# Patient Record
Sex: MALE | Race: WHITE | NOT HISPANIC OR LATINO | URBAN - METROPOLITAN AREA
[De-identification: names, ages, dates, MRNs, and addresses within clinical notes are randomized per-mention and may not be internally consistent; named-entity substitution may affect disease eponyms.]

---

## 2019-04-22 ENCOUNTER — AMBULATORY - HEALTHEAST (OUTPATIENT)
Dept: GERIATRICS | Facility: CLINIC | Age: 84
End: 2019-04-22

## 2019-04-22 RX ORDER — LIDOCAINE 40 MG/G
1 CREAM TOPICAL 3 TIMES DAILY PRN
Status: SHIPPED | COMMUNITY
Start: 2019-04-22

## 2019-04-22 RX ORDER — LEVOTHYROXINE SODIUM 100 UG/1
100 TABLET ORAL DAILY
Status: SHIPPED | COMMUNITY
Start: 2019-04-22

## 2019-04-22 RX ORDER — ACETAMINOPHEN 325 MG/1
325-650 TABLET ORAL EVERY 4 HOURS PRN
Status: SHIPPED | COMMUNITY
Start: 2019-04-22

## 2019-04-22 RX ORDER — POLYETHYLENE GLYCOL 3350 17 G/17G
17 POWDER, FOR SOLUTION ORAL DAILY PRN
Status: SHIPPED | COMMUNITY
Start: 2019-04-22

## 2019-04-23 ENCOUNTER — OFFICE VISIT - HEALTHEAST (OUTPATIENT)
Dept: GERIATRICS | Facility: CLINIC | Age: 84
End: 2019-04-23

## 2019-04-23 DIAGNOSIS — R41.89 COGNITIVE IMPAIRMENT: ICD-10-CM

## 2019-04-23 DIAGNOSIS — I10 HYPERTENSION, UNSPECIFIED TYPE: ICD-10-CM

## 2019-04-23 DIAGNOSIS — I48.91 ATRIAL FIBRILLATION, NEW ONSET (H): ICD-10-CM

## 2019-04-23 DIAGNOSIS — E03.9 HYPOTHYROIDISM, UNSPECIFIED TYPE: ICD-10-CM

## 2019-04-23 DIAGNOSIS — I63.40 CEREBROVASCULAR ACCIDENT (CVA) DUE TO EMBOLISM OF CEREBRAL ARTERY (H): ICD-10-CM

## 2019-04-26 ENCOUNTER — OFFICE VISIT - HEALTHEAST (OUTPATIENT)
Dept: GERIATRICS | Facility: CLINIC | Age: 84
End: 2019-04-26

## 2019-04-26 DIAGNOSIS — I10 HYPERTENSION, UNSPECIFIED TYPE: ICD-10-CM

## 2019-04-26 DIAGNOSIS — R00.1 BRADYCARDIA: ICD-10-CM

## 2019-04-26 DIAGNOSIS — I63.40 CEREBROVASCULAR ACCIDENT (CVA) DUE TO EMBOLISM OF CEREBRAL ARTERY (H): ICD-10-CM

## 2019-04-26 DIAGNOSIS — R41.89 COGNITIVE IMPAIRMENT: ICD-10-CM

## 2019-04-30 ENCOUNTER — OFFICE VISIT - HEALTHEAST (OUTPATIENT)
Dept: GERIATRICS | Facility: CLINIC | Age: 84
End: 2019-04-30

## 2019-04-30 DIAGNOSIS — F32.9 REACTIVE DEPRESSION: ICD-10-CM

## 2019-04-30 DIAGNOSIS — R41.89 COGNITIVE IMPAIRMENT: ICD-10-CM

## 2019-05-02 ENCOUNTER — OFFICE VISIT - HEALTHEAST (OUTPATIENT)
Dept: GERIATRICS | Facility: CLINIC | Age: 84
End: 2019-05-02

## 2019-05-02 DIAGNOSIS — F32.9 REACTIVE DEPRESSION: ICD-10-CM

## 2019-05-02 DIAGNOSIS — R00.1 BRADYCARDIA: ICD-10-CM

## 2019-05-02 DIAGNOSIS — R41.89 COGNITIVE IMPAIRMENT: ICD-10-CM

## 2019-05-08 ENCOUNTER — OFFICE VISIT - HEALTHEAST (OUTPATIENT)
Dept: GERIATRICS | Facility: CLINIC | Age: 84
End: 2019-05-08

## 2019-05-08 DIAGNOSIS — I10 HYPERTENSION, UNSPECIFIED TYPE: ICD-10-CM

## 2019-05-08 DIAGNOSIS — F32.9 REACTIVE DEPRESSION: ICD-10-CM

## 2019-05-08 DIAGNOSIS — R41.89 COGNITIVE IMPAIRMENT: ICD-10-CM

## 2019-05-10 ENCOUNTER — OFFICE VISIT - HEALTHEAST (OUTPATIENT)
Dept: GERIATRICS | Facility: CLINIC | Age: 84
End: 2019-05-10

## 2019-05-10 DIAGNOSIS — E03.9 HYPOTHYROIDISM, UNSPECIFIED TYPE: ICD-10-CM

## 2019-05-10 DIAGNOSIS — I48.91 ATRIAL FIBRILLATION, NEW ONSET (H): ICD-10-CM

## 2019-05-10 DIAGNOSIS — F32.9 REACTIVE DEPRESSION: ICD-10-CM

## 2019-05-10 DIAGNOSIS — I10 HYPERTENSION, UNSPECIFIED TYPE: ICD-10-CM

## 2019-05-10 DIAGNOSIS — R41.89 COGNITIVE IMPAIRMENT: ICD-10-CM

## 2019-05-10 DIAGNOSIS — I63.40 CEREBROVASCULAR ACCIDENT (CVA) DUE TO EMBOLISM OF CEREBRAL ARTERY (H): ICD-10-CM

## 2019-05-10 DIAGNOSIS — R00.1 BRADYCARDIA: ICD-10-CM

## 2019-05-16 ENCOUNTER — OFFICE VISIT - HEALTHEAST (OUTPATIENT)
Dept: GERIATRICS | Facility: CLINIC | Age: 84
End: 2019-05-16

## 2019-05-16 DIAGNOSIS — E03.9 HYPOTHYROIDISM, UNSPECIFIED TYPE: ICD-10-CM

## 2019-05-16 DIAGNOSIS — I48.91 ATRIAL FIBRILLATION, NEW ONSET (H): ICD-10-CM

## 2019-05-16 DIAGNOSIS — I10 HYPERTENSION, UNSPECIFIED TYPE: ICD-10-CM

## 2019-05-16 DIAGNOSIS — F32.9 REACTIVE DEPRESSION: ICD-10-CM

## 2019-05-16 DIAGNOSIS — R41.89 COGNITIVE IMPAIRMENT: ICD-10-CM

## 2019-05-16 DIAGNOSIS — R00.1 BRADYCARDIA: ICD-10-CM

## 2019-05-16 DIAGNOSIS — I63.40 CEREBROVASCULAR ACCIDENT (CVA) DUE TO EMBOLISM OF CEREBRAL ARTERY (H): ICD-10-CM

## 2019-05-16 DIAGNOSIS — G47.30 SLEEP APNEA, UNSPECIFIED TYPE: ICD-10-CM

## 2019-05-16 RX ORDER — FUROSEMIDE 20 MG
20 TABLET ORAL DAILY
Status: SHIPPED | COMMUNITY
Start: 2019-05-16

## 2019-05-20 ENCOUNTER — AMBULATORY - HEALTHEAST (OUTPATIENT)
Dept: GERIATRICS | Facility: CLINIC | Age: 84
End: 2019-05-20

## 2020-02-26 ENCOUNTER — RECORDS - HEALTHEAST (OUTPATIENT)
Dept: LAB | Facility: CLINIC | Age: 85
End: 2020-02-26

## 2020-02-26 LAB
ANION GAP SERPL CALCULATED.3IONS-SCNC: 5 MMOL/L (ref 5–18)
BUN SERPL-MCNC: 12 MG/DL (ref 8–28)
CALCIUM SERPL-MCNC: 9.1 MG/DL (ref 8.5–10.5)
CHLORIDE BLD-SCNC: 101 MMOL/L (ref 98–107)
CO2 SERPL-SCNC: 29 MMOL/L (ref 22–31)
CREAT SERPL-MCNC: 0.78 MG/DL (ref 0.7–1.3)
GFR SERPL CREATININE-BSD FRML MDRD: >60 ML/MIN/1.73M2
GLUCOSE BLD-MCNC: 119 MG/DL (ref 70–125)
POTASSIUM BLD-SCNC: 4.5 MMOL/L (ref 3.5–5)
SODIUM SERPL-SCNC: 135 MMOL/L (ref 136–145)

## 2020-02-29 ENCOUNTER — RECORDS - HEALTHEAST (OUTPATIENT)
Dept: LAB | Facility: CLINIC | Age: 85
End: 2020-02-29

## 2020-02-29 LAB
C DIFF TOX B STL QL: NEGATIVE
RIBOTYPE 027/NAP1/BI: NORMAL

## 2020-03-05 ENCOUNTER — RECORDS - HEALTHEAST (OUTPATIENT)
Dept: LAB | Facility: CLINIC | Age: 85
End: 2020-03-05

## 2020-03-05 LAB
ANION GAP SERPL CALCULATED.3IONS-SCNC: 7 MMOL/L (ref 5–18)
BUN SERPL-MCNC: 9 MG/DL (ref 8–28)
CALCIUM SERPL-MCNC: 9.1 MG/DL (ref 8.5–10.5)
CHLORIDE BLD-SCNC: 102 MMOL/L (ref 98–107)
CO2 SERPL-SCNC: 28 MMOL/L (ref 22–31)
CREAT SERPL-MCNC: 0.71 MG/DL (ref 0.7–1.3)
GFR SERPL CREATININE-BSD FRML MDRD: >60 ML/MIN/1.73M2
GLUCOSE BLD-MCNC: 86 MG/DL (ref 70–125)
POTASSIUM BLD-SCNC: 3.5 MMOL/L (ref 3.5–5)
SODIUM SERPL-SCNC: 137 MMOL/L (ref 136–145)

## 2021-05-26 ENCOUNTER — RECORDS - HEALTHEAST (OUTPATIENT)
Dept: ADMINISTRATIVE | Facility: CLINIC | Age: 86
End: 2021-05-26

## 2021-05-27 ENCOUNTER — RECORDS - HEALTHEAST (OUTPATIENT)
Dept: ADMINISTRATIVE | Facility: CLINIC | Age: 86
End: 2021-05-27

## 2021-05-28 NOTE — PROGRESS NOTES
Code Status:  FULL CODE  Visit Type: Discharge Summary     Facility:  [facility] [[id number]]          PCP:  Provider, No Primary Care  None       Admission Date to our Facility: April 20, 2019 from [facility]  discharge Date from our Facility: May 18, 2019    Discharge Diagnosis:    1. Cognitive impairment     2. Reactive depression     3. Cerebrovascular accident (CVA) due to embolism of cerebral artery (H)     4. Bradycardia     5. Hypertension, unspecified type     6. Hypothyroidism, unspecified type     7. Atrial fibrillation, new onset (H)     8. Sleep apnea, unspecified type          History of Present Illness: Jonathan Kurtz is a 89 y.o. male     Skilled Nursing Facility Course: Mr. Kurtz is a delightful 89-year-old gentleman with history of hypertension hypothyroidism and obstructive sleep apnea who developed 1 to 2 weeks of cognitive confusion and memory loss.  He further declined requiring more use of his walker than he usually was doing.  Finally he developed some speech difficulty and word finding and slurring of his words.  [facility] found new subacute left parietal lobe infarct with evidence of chronic multiple infarcts likely embolic origin.  Also he was having and newly diagnosed atrial fibrillation.  TPA was not indicated and there were no vascular abnormalities.  His A1c was excellent at 6.5 and his LDL was 91.  TTE bubble study did not show any shunt or bilateral enlargement.  Unfortunately through all of this he became rather profoundly depressed and withdrawn.  They elected to transfer him to us for ongoing therapy.  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    Facility course; we had to changed him from aspirin to apixaban per neurology recommendation.  We asked for a speech therapy consult because of the  memory impairment for cognitive training.  He also was not sleeping in we added trazodone at at bedtime which eventually worked well.  We had offered citalopram as he was clearly depressed and he agreed to take it on and off and then ultimately elected not to take it.  We added compression stockings as he did have some swelling.  We discontinued amlodipine and added Lasix at 20 mg daily on the eighth.  He was a declining the Lipitor that they had offered him and the Celexa so we formally discontinued both on the 10th.  He made gradual progress in his memory impairment in his abilities for ambulation gradually improved to his baseline.  He is certainly independent can walk on his own and he uses the walker for longer distances.  His short-term insight increased but he overall struggles with baseline cognitive deficit.  Initially he scored only an 11/30 on the [place] cognitive assessment.  His abilities now are functionally significantly better than that.    Discharge Medications: Discharge medicines include again the discontinuation of the amlodipine because of the swelling, Lasix 20 mg daily, levothyroxine 0.1 mg daily, lidocaine cream for knee pain topically 3 times daily, MiraLAX 17 g daily, potassium chloride extended release 10 mEq daily, trazodone 25 mg at at bedtime.  His CPAP machine is indicated.  Tylenol  650 mg every 4 hours for pain 5-10/10 and 325 mg for pain 1-5/10 as needed every 4 hours  Current Outpatient Medications   Medication Sig Dispense Refill     acetaminophen (TYLENOL) 325 MG tablet Take 325-650 mg by mouth every 4 (four) hours as needed for pain.       amLODIPine (NORVASC) 5 MG tablet Take 5 mg by mouth daily.       apixaban (ELIQUIS) 5 mg Tab tablet Take 5 mg by mouth 2 (two) times a day.       atorvastatin (LIPITOR) 80 MG tablet Take 80 mg by mouth at bedtime.       levothyroxine (SYNTHROID, LEVOTHROID) 100 MCG tablet Take 100 mcg by mouth daily.       lidocaine (LMX) 4 % cream Apply 1  application topically 3 (three) times a day as needed (apply for knee pain).       polyethylene glycol (MIRALAX) 17 gram packet Take 17 g by mouth daily as needed.       potassium chloride (KLOR-CON) 10 MEQ CR tablet Take 10 mEq by mouth daily.       No current facility-administered medications for this visit.        For most current and accurate medication list, please contact the [facility] nursing facility that this patient visit took place at.      [facility]charge Plan: [facility] with RN/PT/home health aide/speech therapy.  [facility] the [facility] of May 20  Review of Systems     Physical Exam   Constitutional:   Patient is very insightful and appears to have less rumination about depression there are less feelings of worthlessness that he voices.  He is quoting Sonido Poe this morning about adjusting to change.  He is looking to the positive that he and his wife will be reunited again and is trying to graciously except that his memory is not what it was and that he can no longer drive.  He has gained 1.8 pounds since being with us.  His vital signs have been excellent he still does have pedal edema and he still runs of bradycardic heart rate at 53.  He is been able to use his CPAP without incident.   Vitals reviewed.      Labs:  All labs reviewed in the nursing home record.  No results found for this or any previous visit (from the past 240 hour(s)).    Assessment:  1. Cognitive impairment     2. Reactive depression     3. Cerebrovascular accident (CVA) due to embolism of cerebral artery (H)     4. Bradycardia     5. Hypertension, unspecified type     6. Hypothyroidism, unspecified type     7. Atrial fibrillation, new onset (H)     8. Sleep apnea, unspecified type         MEDICAL EQUIPMENT NEEDS:        DISCHARGE PLAN/FACE TO FACE:  I certify that services are/were furnished while this patient was under the care of a physician and that a physician or  an allowed non-physician practitioner (NPP), had a face-to-face encounter that meets the physician face-to-face encounter requirements. The encounter was in whole, or in part, related to the primary reason for home health. The patient is confined to his/her home and needs intermittent skilled nursing, physical therapy, speech-language pathology, or the continued need for occupational therapy. A plan of care has been established by a physician and is periodically reviewed by a physician.    I certify that this patient is under my care and that I, or a nurse practitioner or physician's assistant working with me, had a face-to-face encounter that meets the physician face-to-face encounter requirements with this patient.   Date of Face-to-Face Encounter: 5/16/2019    I certify that, based on my findings, the following services are medically necessary home health services: RN/PT/home health aide/speech therapy    My clinical findings support the need for the above skilled services because: (Please write a brief narrative summary that describes what the RN, PT, SLP, or other services will be doing in the home. A list of diagnoses in this section does not meet the CMS requirements.)  Patient is going to need to adapt to a assisted living and decreased independence.  He will also need guidance on his medicines with his memory impairment that he can perform that correctly.  Physical therapy will need to follow-up in regards to his transition from complete independence to occasionally using of the 2 wheel walker.  Home health aide to make sure that the the home is safe particular because his wife has cognitive delay as well.  Speech therapy for ongoing cognitive training.    This patient is homebound because: (Please write a brief narrative summary describing the functional limitations as to why this patient is homebound and specifically what makes this patient homebound.)  He is unable to drive secondary to cognitive deficit  at worst [place] cognitive assessment 11/30    The patient is, or has been, under my care and I have initiated the establishment of the plan of care. This patient will be followed by a physician who will periodically review the plan of care.    45 minutes total time of which 65% was in face to face communication with patient about above plan of care.    Electronically signed by: Quan Luis MD

## 2021-05-28 NOTE — PROGRESS NOTES
Code Status:  DNR/DNI  Visit Type: H & P     Facility:  [facility] [[id number]]      Facility Type: SNF (Skilled Nursing Facility, TCU)    History of Present Illness:   Hospital Admission Date: April 15, 2019 [facility] Discharge Date: April 20, 2019  Facility Admission Date: April 20, 2019 [facility] care unit    Jonathan Kurtz is a 89 y.o. male who has a history of hypertension hypothyroidism and obstructive sleep apnea living in a care facility and had developed 1-2 weeks prior change in behavior not tracking conversations and could not remember his medications and to use his CPAP at night as well had troubles with significant short-term memory.  He was using his walker more frequently as his balance appeared to be affected.  He then developed into having some speech difficulty with word finding and slurring his words.  Patient had usually been very healthy was not a smoker and had been doing transcendental meditation classes for many years.  They took him to the emergency department in the [facility].    Hospital course; in the ED a CT of the head found a new subacute left parietal lobe infarct with evidence of multiple chronic infarcts likely embolic in origin.  Their workup also revealed new diagnosed atrial fibrillation.  Neurology felt that he was beyond the timeline for TPA.  The CTA of the head and neck did not show any vascular abnormalities of hemorrhage.  His cholesterol was good with an LDL of 91 and his A1c was 6.5.  They did a TTE with bubble study and this did not show any intra-atrial shunt but did show bilateral biatrial enlargement with his ongoing A. fib.  He became depressed during the hospitalization with new cognitive changes.    They elected to transfer him to us.  He is been on a vegetarian diet and they recommended continuing him on the aspirin 325 mg daily till April 29 and then transition to apixaban 5 mg twice  daily on the 30th.  Although I do notice that the orders that we have have him on both.  They do recommend Lipitor 80 daily.  They have him following up with neurology in 4 weeks.  In regards to the new onset A. fib the felt aspirin for now and transition to apixaban.  Because our orders show that he is on both we will ask for clarification.    Past Medical History:   Diagnosis Date     A-fib (H)      Cognitive impairment     moderate     DMII (diabetes mellitus, type 2) (H)      Embolic stroke (H)      HTN (hypertension)      Hypothyroidism      SUJATHA (obstructive sleep apnea)      Parietal lobe infarction      Peripheral edema      Pulmonary hypertension (H)      Past Surgical History:   Procedure Laterality Date     GA ARTHROPLASTY TIBIAL PLATEAU      Description: Knee Replacement;  Proc Date: 04/01/2012;  Comments: [facility]     GA REMOVAL OF SPERM DUCT(S)      Description: Surgery Of Male Genitalia Vasectomy;  Recorded: 01/07/2010;     GA REMOVAL OF TONSILS,<13 Y/O      Description: Tonsillectomy;  Recorded: 01/07/2010;     No family history on file.  Social History     Socioeconomic History     Marital status:      Spouse name: Not on file     Number of children: Not on file     Years of education: Not on file     Highest education level: Not on file   Occupational History     Not on file   Social Needs     Financial resource strain: Not on file     Food insecurity:     Worry: Not on file     Inability: Not on file     Transportation needs:     Medical: Not on file     Non-medical: Not on file   Tobacco Use     Smoking status: Not on file   Substance and Sexual Activity     Alcohol use: Not on file     Drug use: Not on file     Sexual activity: Not on file   Lifestyle     Physical activity:     Days per week: Not on file     Minutes per session: Not on file     Stress: Not on file   Relationships     Social connections:     Talks on phone: Not on file     Gets together: Not on file      Attends Anabaptism service: Not on file     Active member of club or organization: Not on file     Attends meetings of clubs or organizations: Not on file     Relationship status: Not on file     Intimate partner violence:     Fear of current or ex partner: Not on file     Emotionally abused: Not on file     Physically abused: Not on file     Forced sexual activity: Not on file   Other Topics Concern     Not on file   Social History Narrative     Not on file     Additional Geriatric Review uncertain historian but states he has 2 children and he and his wife of 6 others.  He was living in an assisted living.  Uncertain if this is true.  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  Current Outpatient Medications   Medication Sig Dispense Refill     acetaminophen (TYLENOL) 325 MG tablet Take 325-650 mg by mouth every 4 (four) hours as needed for pain.       amLODIPine (NORVASC) 5 MG tablet Take 5 mg by mouth daily.       apixaban (ELIQUIS) 5 mg Tab tablet Take 5 mg by mouth 2 (two) times a day.       aspirin 325 MG EC tablet Take 325 mg by mouth daily.       atorvastatin (LIPITOR) 80 MG tablet Take 80 mg by mouth at bedtime.       levothyroxine (SYNTHROID, LEVOTHROID) 100 MCG tablet Take 100 mcg by mouth daily.       lidocaine (LMX) 4 % cream Apply 1 application topically 3 (three) times a day as needed (apply for knee pain).       polyethylene glycol (MIRALAX) 17 gram packet Take 17 g by mouth daily as needed.       potassium chloride (KLOR-CON) 10 MEQ CR tablet Take 10 mEq by mouth daily.       No current facility-administered medications for this visit.      Allergies   Allergen Reactions     Naprosyn [Naproxen]      Immunization History   Administered Date(s) Administered     Pneumo Polysac 23-V 04/17/2012     Td,adult,historic,unspecified 01/01/2007         Review of Systems   Unable to perform ROS: Acuity of condition        Physical Exam   Constitutional:  He appears well-developed and well-nourished.   HENT:   Head: Normocephalic and atraumatic.   Right Ear: External ear normal.   Left Ear: External ear normal.   Nose: Nose normal.   Mouth/Throat: Oropharynx is clear and moist.   Eyes: Pupils are equal, round, and reactive to light. Conjunctivae and EOM are normal. Right eye exhibits no discharge. Left eye exhibits no discharge.   Neck: Neck supple. No JVD present. No tracheal deviation present. No thyromegaly present.   Cardiovascular: Normal rate, regular rhythm and intact distal pulses.   No murmur heard.  Irregular beat not rapid   Pulmonary/Chest: Effort normal and breath sounds normal. No respiratory distress. He has no wheezes. He has no rales. He exhibits no tenderness.   Abdominal: Soft. Bowel sounds are normal. He exhibits no distension and no mass. There is no tenderness. There is no guarding.   Musculoskeletal: Normal range of motion. He exhibits no edema or tenderness.   Lymphadenopathy:     He has no cervical adenopathy.   Neurological: He is alert. He has normal reflexes. No cranial nerve deficit. Coordination normal.   Tangential easily confused does not appear to be distressed.  Patient was able to walk 200feet and performed seated exercises pivot transfer from wheelchair to NuStep.  But appeared to be quite fatigued.  Needed verbal cues for correct technique as he was getting easily confused.  Patient had difficulty in OT following commands and doing tasks.  Speech therapy said that he scored 11/30 on his [place] cognitive assessment   Skin: Skin is warm and dry. No rash noted. No erythema.   Psychiatric: He has a normal mood and affect. His behavior is normal.         Labs:  All labs reviewed in the nursing home record.    Assessment:  1. Cerebrovascular accident (CVA) due to embolism of cerebral artery (H)     2. Atrial fibrillation, new onset (H)     3. Cognitive impairment     4. Hypertension, unspecified type     5. Hypothyroidism, unspecified  type         Plan: At this juncture we will do speech therapy to try to get his cognition back physical therapy for balance.  We will clarify right away the apixaban order.    Total 45 minutes of which 65% was spent in counseling and coordination of care of the above plan.    Electronically signed by: Quan Luis MD

## 2021-05-28 NOTE — PROGRESS NOTES
Code Status:  DNR/DNI  Visit Type: Problem Visit (Vascular stroke with memory impairment)     Facility:  [facility] [[id number]]        Facility Type: SNF (Skilled Nursing Facility, TCU)    History of Present Illness: Jonathan Kurtz is a 89 y.o. 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  Has an underlying history of hypertension hypothyroidism as well as obstructive sleep apnea.  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  Since being with this is been able to transfer to 300 feet with a walker but needs a lot of visual cues and is contact-guard.  Has had less sleep in the evenings.  He was a former resident of a senior living institution with his wife for 55 years.  He was not able to tell me this in his previous meeting and his wife does give me this information.  Staff reports he is only sleeping 3 hours a night.  He does not have any cognition of this when I speak to him about it    Review of Systems     Physical Exam   Constitutional:   Patient is a very pleasant but initially little bit depressed but when he opens up and I asked him questions about vegetarianism and transcendental meditation he has a number of quotes from all [place] am and other places of his past memory.  When I asked him who the woman in the room was he is really unable to tell me that it is his wife for 55years.  Certain situational information that is recent he is unable to tell me.   Cardiovascular:   Heart rate is non-rapid and irregular 50s to low 60s   Pulmonary/Chest: Effort normal and breath sounds normal.   Vitals reviewed.      Labs:  All labs reviewed in the nursing home record.    Assessment:  1. Cerebrovascular accident (CVA) due to embolism of cerebral artery (H)     2. Cognitive impairment     3. Hypertension, unspecified type     4.  Bradycardia         Plan: At this juncture we will continue therapy and will ask a speech therapy to consult regarding his memory impairment and care and cognition training.  I will also begin trazodone 25 mg at at bedtime for the insomnia.  Follow-up Tuesday      25 minutes spent of which greater than 65  % was face to face communication with the patient about above plan of care this was discussed today with his wife and staff.  As well as with the patient in trying to motivate him    Electronically signed by: Quan Luis MD

## 2021-05-28 NOTE — PROGRESS NOTES
Code Status:  DNR/DNI  Visit Type: Problem Visit (dementia/acp)     Facility:  [facility] [[id number]]        Facility Type: SNF (Skilled Nursing Facility, TCU)    History of Present Illness: Jonathan Kurtz is a 89 y.o. male who has continued to be bad in the same condition with some ambulatory abilities however not much energy.  His vital signs have been normal.  I had the opportunity to meet at length with his 2 children who are most involved in his care and his wife's care.    Review of Systems     Physical Exam   Constitutional:   Sleepy pleasant conversant but globally confused.  He did not really remember who I was but was able to maintain a conversation with appropriate [facility].  Vital signs are normal lungs are clear he has some mild pedal edema   Vitals reviewed.      Labs:  All labs reviewed in the nursing home record.    Assessment:  1. Cognitive impairment     2. Reactive depression     3. Bradycardia         Plan: Had extensive discussion 45 minutes in duration with 2 of his 7 children who are in charge of both he and his wife's medical and legal power of  as well as financial.  We all agreed that both he and his wife are significantly at risk for independent living.  We discussed ways to go forward.  They have a meeting with OT PT review as well as speech therapy where he asked to consult in regards to cognition.  However in the end I do not think they would be safe or reliable for cooking.  We discussed him going back to the senior living arrangements for the short-term versus going directly to assisted living.  They were keen on possibly coming to our facility.  At this juncture we suggested that they look at the pricing for assisted livings as well as the duration of stay allowable here to look at timing.  When this was all organized presentation to the family both his wife and the patient would be most prudent.  They did prioritize quality of life which would mean them being  together.      45 minutes spent of which greater than 85% was face to face communication with the patient about above plan of care face-to-face conference was with his 2 children    Electronically signed by: Quan Luis MD

## 2021-05-28 NOTE — PROGRESS NOTES
"Code Status:  FULL CODE  Visit Type: Problem Visit (Follow-up advance care planning/depression)     Facility:  [facility] [[id number]]        Facility Type: SNF (Skilled Nursing Facility, TCU)    History of Present Illness: Jonathan Kurtz is a 89 y.o. male I am seeing back in follow-up for progress.  Ambulatory wise that he is clear and doing well.  However he has a cognitive abilities are still problematic.    Recommendations from therapy was that he was 24-hour supervision.  Family and wife have been informed of this.  He had steadfastly refused the Celexa and actually only took 1 dose total.  He is declined further interventions there.  He has no complaints other than how wondering what we are going to do \"\"    Review of Systems     Physical Exam   Constitutional:   Patient is alert and interactive and in present moment can take on a good conversation but any querying on recent memory is problematic.  Heart is still bradycardic but clear.  He is observed with very good mobility on exercise device is in physical therapy.  Occupational Therapy states that he can do most of his own cares without difficulty.   Vitals reviewed.      Labs:  All labs reviewed in the nursing home record.    Assessment:  1. Cognitive impairment     2. Reactive depression     3. Hypertension, unspecified type         Plan: At this juncture I am not going to push the benefit of Celexa he does seem from a mood standpoint to have improved/adjusted it.  Because of this we will assist the family in finding him appropriate placement.  Patient's daughter calls later and apparently patient had been on Lasix.  They feel that his legs are more puffy.  We discussed that he had had these almost unchanged and weight and blood pressure as well.  Because he is on amlodipine I am going to discontinue the amlodipine and we will start him on 20 of Lasix daily.  We may get a BMP screening down the line.  I discussed this with daughter and we " discussed advanced care planning with anticipatory discharge on May 17.      25 minutes spent of which greater than 65% was face to face communication with the patient about above plan of care    Electronically signed by: Quan Luis MD
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
## 2021-05-28 NOTE — PROGRESS NOTES
Code Status:  DNR/DNI  Visit Type: Problem Visit (Preparatory for discharge)     Facility:  [facility] [[id number]]        Facility Type: SNF (Skilled Nursing Facility, TCU)    History of Present Illness: Jonathan Kurtz is a 89 y.o. male I am seeing in follow-up as we prepare for him and his wife to go to assisted living.  He is consistently refused the Celexa foot his mood seems to be slightly better.  He had quite a bit of tension about taking such a medicine.  He does question all the pills that we give him.  He came to us after having multiple embolic infarcts M dementia.  Possibly multiple infarct dementia but clearly embolic strokes.  He was placed on Eliquis and Lipitor.  Cognitive testing revealed an 11/30 with profound deficits in expression comprehension reading writing and mathematics he also had a lack of awareness of his deficit.  On serial visitations he will recognize that his memory is not good.  He denies pain or discomfort.  His wishes to be home with his wife.    Review of Systems     Physical Exam   Cardiovascular:   Intermittent irregular rates with bradycardia today down to 48 low so far   Pulmonary/Chest:   Lungs clear to auscultation without wheeze rale or rhonchi.  Tolerating CPAP at night per report   Musculoskeletal:   Heel to walk 300 feet and do most of his independent cares but lack of insight for deep hygiene   Psychiatric:   Still reasoning deficit noted   Vitals reviewed.      Labs:  All labs reviewed in the nursing home record.    Assessment:  1. Cognitive impairment     2. Reactive depression     3. Hypertension, unspecified type     4. Bradycardia     5. Cerebrovascular accident (CVA) due to embolism of cerebral artery (H)     6. Atrial fibrillation, new onset (H)     7. Hypothyroidism, unspecified type         Plan: Feel that [facility] and asked [facility] and OT to do some specific to their area.  In summary do not feel that he is a capable of living  independent and would be at a risk for both he and his wife if they were to do so.  Definitely do not believe that he is capable of driving.  I do feel that they would be safe as long as they did not have to do cooking and could come for meals.  He does interact socially well.  Bradycardia down the line may be problematic but currently he is stable.  I discussed with the family and we will discontinue the Lipitor as the risk of continuing on it with side effects is probably as great is a small benefit will get from being on it with his 5-year morbidity and mortality.  I do feel that with some supervision that he would be capable with his wife being in an assisted living.  Home environment he would really need 24-hour supervision.  He absolutely should not drive.  Discussed with son and daughter that we will discontinue his Lipitor and Celexa and go forward with the assisted-living evaluation.  We did discontinue his amlodipine and place him on Lasix.  Showed a small improvement in his edema and did not bottom out his blood pressure unduly.  Further it did not affect his weight either up or down.  We will continue that management this note will be copied and used [facility] with the [facility].      35 minutes spent of which greater than 75% was face to face communication with the patient about above plan of care this includes time preparing notes for [facility] as well as a discussing with both daughter and son.    Electronically signed by: Quan Luis MD

## 2021-06-02 VITALS — WEIGHT: 227.4 LBS

## 2021-06-03 VITALS — WEIGHT: 229.6 LBS

## 2021-06-03 VITALS — WEIGHT: 230.4 LBS

## 2021-06-03 VITALS — WEIGHT: 228.4 LBS

## 2021-06-03 VITALS — WEIGHT: 229.8 LBS

## 2021-06-19 NOTE — LETTER
Letter by Quan Luis MD at      Author: Quan Luis MD Service: -- Author Type: --    Filed:  Encounter Date: 5/10/2019 Status: (Other)         Patient: Jonathan Kurtz   MR Number: [id number]   YOB: 1928   Date of Visit: 5/10/2019     Code Status:  DNR/DNI  Visit Type: Problem Visit (Preparatory for discharge)     Facility:  [facility] [[id number]]        Facility Type: SNF (Skilled Nursing Facility, TCU)    History of Present Illness: Jonathan Kurtz is a 89 y.o. male I am seeing in follow-up as we prepare for him and his wife to go to assisted living.  He is consistently refused the Celexa foot his mood seems to be slightly better.  He had quite a bit of tension about taking such a medicine.  He does question all the pills that we give him.  He came to us after having multiple embolic infarcts M dementia.  Possibly multiple infarct dementia but clearly embolic strokes.  He was placed on Eliquis and Lipitor.  Cognitive testing revealed an 11/30 with profound deficits in expression comprehension reading writing and mathematics he also had a lack of awareness of his deficit.  On serial visitations he will recognize that his memory is not good.  He denies pain or discomfort.  His wishes to be home with his wife.    Review of Systems     Physical Exam   Cardiovascular:   Intermittent irregular rates with bradycardia today down to 48 low so far   Pulmonary/Chest:   Lungs clear to auscultation without wheeze rale or rhonchi.  Tolerating CPAP at night per report   Musculoskeletal:   Heel to walk 300 feet and do most of his independent cares but lack of insight for deep hygiene   Psychiatric:   Still reasoning deficit noted   Vitals reviewed.      Labs:  All labs reviewed in the nursing home record.    Assessment:  1. Cognitive impairment     2. Reactive depression     3. Hypertension, unspecified type     4. Bradycardia     5. Cerebrovascular accident (CVA) due to embolism of  cerebral artery (H)     6. Atrial fibrillation, new onset (H)     7. Hypothyroidism, unspecified type         Plan: Feel that the [facility] and asked PT and OT to do some specific to their area.  In summary do not feel that he is a capable of living independent and would be at a risk for both he and his wife if they were to do so.  Definitely do not believe that he is capable of driving.  I do feel that they would be safe as long as they did not have to do cooking and could come for meals.  He does interact socially well.  Bradycardia down the line may be problematic but currently he is stable.  I discussed with the family and we will discontinue the Lipitor as the risk of continuing on it with side effects is probably as great is a small benefit will get from being on it with his 5-year morbidity and mortality.  I do feel that with some supervision that he would be capable with his wife being in an assisted living.  Home environment he would really need 24-hour supervision.  He absolutely should not drive.  Discussed with son and daughter that we will discontinue his Lipitor and Celexa and go forward with the assisted-living evaluation.  We did discontinue his amlodipine and place him on Lasix.  Showed a small improvement in his edema and did not bottom out his blood pressure unduly.  Further it did not affect his weight either up or down.  We will continue that management this note will be copied and used [facility] the [facility] with the [facility].      35 minutes spent of which greater than 75% was face to face communication with the patient about above plan of care this includes time preparing notes for [facility] as well as a discussing with both daughter and son.    Electronically signed by: Quan Luis MD

## 2021-06-19 NOTE — LETTER
Letter by Quan Luis MD at      Author: Quan Luis MD Service: -- Author Type: --    Filed:  Encounter Date: 5/2/2019 Status: (Other)         Patient: Jonathan Kurtz   MR Number: [id number]   YOB: 1928   Date of Visit: 5/2/2019     Code Status:  DNR/DNI  Visit Type: Problem Visit (dementia/acp)     Facility:  [facility] [[id number]]        Facility Type: SNF (Skilled Nursing Facility, TCU)    History of Present Illness: Jonathan Kurtz is a 89 y.o. male who has continued to be bad in the same condition with some ambulatory abilities however not much energy.  His vital signs have been normal.  I had the opportunity to meet at length with his 2 children who are most involved in his care and his wife's care.    Review of Systems     Physical Exam   Constitutional:   Sleepy pleasant conversant but globally confused.  He did not really remember who I was but was able to maintain a conversation with appropriate [employer].  Vital signs are normal lungs are clear he has some mild pedal edema   Vitals reviewed.      Labs:  All labs reviewed in the nursing home record.    Assessment:  1. Cognitive impairment     2. Reactive depression     3. Bradycardia         Plan: Had extensive discussion 45 minutes in duration with 2 of his 7 children who are in charge of both he and his wife's medical and legal power of  as well as financial.  We all agreed that both he and his wife are significantly at risk for independent living.  We discussed ways to go forward.  They have a meeting with OT PT review as well as speech therapy where he asked to consult in regards to cognition.  However in the end I do not think they would be safe or reliable for cooking.  We discussed him going back to the senior living arrangements for the short-term versus going directly to assisted living.  They were keen on possibly coming to our facility.  At this juncture we suggested that they look at the pricing  for assisted livings as well as the duration of stay allowable here to look at timing.  When this was all organized presentation to the family both his wife and the patient would be most prudent.  They did prioritize quality of life which would mean them being together.      45 minutes spent of which greater than 85% was face to face communication with the patient about above plan of care face-to-face conference was with his 2 children    Electronically signed by: Quan Luis MD

## 2021-06-19 NOTE — LETTER
Letter by Quan Luis MD at      Author: Quan Luis MD Service: -- Author Type: --    Filed:  Encounter Date: 4/26/2019 Status: (Other)         Patient: Jonathan Kurtz   MR Number: [id number]   YOB: 1928   Date of Visit: 4/26/2019     Code Status:  DNR/DNI  Visit Type: Problem Visit (Vascular stroke with memory impairment)     Facility:  [facility] [[id number]]        Facility Type: SNF (Skilled Nursing Facility, TCU)    History of Present Illness: Jonathan Kurtz is a 89 y.o. 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  Has an underlying history of hypertension hypothyroidism as well as obstructive sleep apnea.  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  Since being with this is been able to transfer to 300 feet with a walker but needs a lot of visual cues and is contact-guard.  Has had less sleep in the evenings.  He was a former resident of a senior living institution with his wife for 55 years.  He was not able to tell me this in his previous meeting and his wife does give me this information.  Staff reports he is only sleeping 3 hours a night.  He does not have any cognition of this when I speak to him about it    Review of Systems     Physical Exam   Constitutional:   Patient is a very pleasant but initially little bit depressed but when he opens up and I asked him questions about vegetarianism and transcendental meditation he has a number of quotes from all [place] and other places of his past memory.  When I asked him who the woman in the room was he is really unable to tell me that it is his wife for 55years.  Certain situational information that is recent he is unable to tell me.   Cardiovascular:   Heart rate is non-rapid and irregular 50s to low 60s   Pulmonary/Chest: Effort normal and breath  sounds normal.   Vitals reviewed.      Labs:  All labs reviewed in the nursing home record.    Assessment:  1. Cerebrovascular accident (CVA) due to embolism of cerebral artery (H)     2. Cognitive impairment     3. Hypertension, unspecified type     4. Bradycardia         Plan: At this juncture we will continue therapy and will ask a speech therapy to consult regarding his memory impairment and care and cognition training.  I will also begin trazodone 25 mg at at bedtime for the insomnia.  Follow-up Tuesday      25 minutes spent of which greater than 65  % was face to face communication with the patient about above plan of care this was discussed today with his wife and staff.  As well as with the patient in trying to motivate him    Electronically signed by: Quan Luis MD
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
## 2021-06-19 NOTE — LETTER
Letter by Quan Luis MD at      Author: Quan Luis MD Service: -- Author Type: --    Filed:  Encounter Date: 5/16/2019 Status: (Other)         Patient: Jonathan Kurtz   MR Number: [id number]   YOB: 1928   Date of Visit: 5/16/2019     Code Status:  FULL CODE  Visit Type: Discharge Summary     Facility:  [facility] [[id number]]          PCP:  Provider, No Primary Care  None       Admission Date to our Facility: April 20, 2019 from [facility]  discharge Date from our Facility: May 18, 2019    Discharge Diagnosis:    1. Cognitive impairment     2. Reactive depression     3. Cerebrovascular accident (CVA) due to embolism of cerebral artery (H)     4. Bradycardia     5. Hypertension, unspecified type     6. Hypothyroidism, unspecified type     7. Atrial fibrillation, new onset (H)     8. Sleep apnea, unspecified type          History of Present Illness: Jonathan Kurtz is a 89 y.o. male     Skilled Nursing Facility Course: Mr. Kurtz is a delightful 89-year-old gentleman with history of hypertension hypothyroidism and obstructive sleep apnea who developed 1 to 2 weeks of cognitive confusion and memory loss.  He further declined requiring more use of his walker than he usually was doing.  Finally he developed some speech difficulty and word finding and slurring of his words.  [facility] found new subacute left parietal lobe infarct with evidence of chronic multiple infarcts likely embolic origin.  Also he was having and newly diagnosed atrial fibrillation.  TPA was not indicated and there were no vascular abnormalities.  His A1c was excellent at 6.5 and his LDL was 91.  TTE bubble study did not show any shunt or bilateral enlargement.  Unfortunately through all of this he became rather profoundly depressed and withdrawn.  They elected to transfer him to us for ongoing therapy.  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    Facility course; we had to changed him from aspirin to apixaban per neurology recommendation.  We asked for a speech therapy consult because of the memory impairment for cognitive training.  He also was not sleeping in we added trazodone at at bedtime which eventually worked well.  We had offered citalopram as he was clearly depressed and he agreed to take it on and off and then ultimately elected not to take it.  We added compression stockings as he did have some swelling.  We discontinued amlodipine and added Lasix at 20 mg daily on the eighth.  He was a declining the Lipitor that they had offered him and the Celexa so we formally discontinued both on the 10th.  He made gradual progress in his memory impairment in his abilities for ambulation gradually improved to his baseline.  He is certainly independent can walk on his own and he uses the walker for longer distances.  His short-term insight increased but he overall struggles with baseline cognitive deficit.  Initially he scored only an 11/30 on the [place] cognitive assessment.  His abilities now are functionally significantly better than that.    Discharge Medications: Discharge medicines include again the discontinuation of the amlodipine because of the swelling, Lasix 20 mg daily, levothyroxine 0.1 mg daily, lidocaine cream for knee pain topically 3 times daily, MiraLAX 17 g daily, potassium chloride extended release 10 mEq daily, trazodone 25 mg at at bedtime.  His CPAP machine is indicated.  Tylenol  650 mg every 4 hours for pain 5-10/10 and 325 mg for pain 1-5/10 as needed every 4 hours  Current Outpatient Medications   Medication Sig Dispense Refill   ? acetaminophen (TYLENOL) 325 MG tablet Take 325-650 mg by mouth every 4 (four) hours as needed for pain.     ? amLODIPine (NORVASC) 5 MG tablet Take 5 mg by mouth daily.     ? apixaban  (ELIQUIS) 5 mg Tab tablet Take 5 mg by mouth 2 (two) times a day.     ? atorvastatin (LIPITOR) 80 MG tablet Take 80 mg by mouth at bedtime.     ? levothyroxine (SYNTHROID, LEVOTHROID) 100 MCG tablet Take 100 mcg by mouth daily.     ? lidocaine (LMX) 4 % cream Apply 1 application topically 3 (three) times a day as needed (apply for knee pain).     ? polyethylene glycol (MIRALAX) 17 gram packet Take 17 g by mouth daily as needed.     ? potassium chloride (KLOR-CON) 10 MEQ CR tablet Take 10 mEq by mouth daily.       No current facility-administered medications for this visit.        For most current and accurate medication list, please contact the skilled nursing facility that this patient visit took place at.      [facility]charge Plan: [facility] with RN/PT/home health aide/speech therapy.  [facility] the [facility] of May 20  Review of Systems     Physical Exam   Constitutional:   Patient is very insightful and appears to have less rumination about depression there are less feelings of worthlessness that he voices.  He is quoting Sonido Poe this morning about adjusting to change.  He is looking to the positive that he and his wife will be reunited again and is trying to graciously except that his memory is not what it was and that he can no longer drive.  He has gained 1.8 pounds since being with us.  His vital signs have been excellent he still does have pedal edema and he still runs of bradycardic heart rate at 53.  He is been able to use his CPAP without incident.   Vitals reviewed.      Labs:  All labs reviewed in the nursing home record.  No results found for this or any previous visit (from the past 240 hour(s)).    Assessment:  1. Cognitive impairment     2. Reactive depression     3. Cerebrovascular accident (CVA) due to embolism of cerebral artery (H)     4. Bradycardia     5. Hypertension, unspecified type     6. Hypothyroidism, unspecified type      7. Atrial fibrillation, new onset (H)     8. Sleep apnea, unspecified type         MEDICAL EQUIPMENT NEEDS:        DISCHARGE PLAN/FACE TO FACE:  I certify that services are/were furnished while this patient was under the care of a physician and that a physician or an allowed non-physician practitioner (NPP), had a face-to-face encounter that meets the physician face-to-face encounter requirements. The encounter was in whole, or in part, related to the primary reason for home health. The patient is confined to his/her home and needs intermittent skilled nursing, physical therapy, speech-language pathology, or the continued need for occupational therapy. A plan of care has been established by a physician and is periodically reviewed by a physician.    I certify that this patient is under my care and that I, or a nurse practitioner or physician's assistant working with me, had a face-to-face encounter that meets the physician face-to-face encounter requirements with this patient.   Date of Face-to-Face Encounter: 5/16/2019    I certify that, based on my findings, the following services are medically necessary home health services: RN/PT/home health aide/speech therapy    My clinical findings support the need for the above skilled services because: (Please write a brief narrative summary that describes what the RN, PT, SLP, or other services will be doing in the home. A list of diagnoses in this section does not meet the CMS requirements.)  Patient is going to need to adapt to a assisted living and decreased independence.  He will also need guidance on his medicines with his memory impairment that he can perform that correctly.  Physical therapy will need to follow-up in regards to his transition from complete independence to occasionally using of the 2 wheel walker.  Home health aide to make sure that the the home is safe particular because his wife has cognitive delay as well.  Speech therapy for ongoing cognitive  training.    This patient is homebound because: (Please write a brief narrative summary describing the functional limitations as to why this patient is homebound and specifically what makes this patient homebound.)  He is unable to drive secondary to cognitive deficit at worst [place] cognitive assessment 11/30    The patient is, or has been, under my care and I have initiated the establishment of the plan of care. This patient will be followed by a physician who will periodically review the plan of care.    45 minutes total time of which 65% was in face to face communication with patient about above plan of care.    Electronically signed by: Quan Luis MD

## 2021-06-19 NOTE — LETTER
"Letter by Quan Luis MD at      Author: Quan Luis MD Service: -- Author Type: --    Filed:  Encounter Date: 5/8/2019 Status: (Other)         Patient: Jonathan Kurtz   MR Number: [id number]   YOB: 1928   Date of Visit: 5/8/2019     Code Status:  FULL CODE  Visit Type: Problem Visit (Follow-up advance care planning/depression)     Facility:  [facility] [[id number]]        Facility Type: SNF (Skilled Nursing Facility, TCU)    History of Present Illness: Jonathan Kurtz is a 89 y.o. male I am seeing back in follow-up for progress.  Ambulatory wise that he is clear and doing well.  However he has a cognitive abilities are still problematic.    Recommendations from therapy was that he was 24-hour supervision.  Family and wife have been informed of this.  He had steadfastly refused the Celexa and actually only took 1 dose total.  He is declined further interventions there.  He has no complaints other than how wondering what we are going to do \"\"    Review of Systems     Physical Exam   Constitutional:   Patient is alert and interactive and in present moment can take on a good conversation but any querying on recent memory is problematic.  Heart is still bradycardic but clear.  He is observed with very good mobility on exercise device is in physical therapy.  Occupational Therapy states that he can do most of his own cares without difficulty.   Vitals reviewed.      Labs:  All labs reviewed in the nursing home record.    Assessment:  1. Cognitive impairment     2. Reactive depression     3. Hypertension, unspecified type         Plan: At this juncture I am not going to push the benefit of Celexa he does seem from a mood standpoint to have improved/adjusted it.  Because of this we will assist the family in finding him appropriate placement.  Patient's daughter calls later and apparently patient had been on Lasix.  They feel that his legs are more puffy.  We discussed that he had had " these almost unchanged and weight and blood pressure as well.  Because he is on amlodipine I am going to discontinue the amlodipine and we will start him on 20 of Lasix daily.  We may get a BMP screening down the line.  I discussed this with daughter and we discussed advanced care planning with anticipatory discharge on May 17.      25 minutes spent of which greater than 65% was face to face communication with the patient about above plan of care    Electronically signed by: Quan Luis MD

## 2021-06-19 NOTE — LETTER
Letter by Quan Luis MD at      Author: Quan Luis MD Service: -- Author Type: --    Filed:  Encounter Date: 4/23/2019 Status: (Other)         Patient: Jonathan Kurtz   MR Number: [id number]   YOB: 1928   Date of Visit: 4/23/2019     Code Status:  DNR/DNI  Visit Type: H & P     Facility:  [facility] [[id number]]      Facility Type: SNF (Skilled Nursing Facility, TCU)    History of Present Illness:   Hospital Admission Date: April 15, 2019 [facility] Discharge Date: April 20, 2019  Facility Admission Date: April 20, 2019 [facility] care unit    Jonathan Kurtz is a 89 y.o. male who has a history of hypertension hypothyroidism and obstructive sleep apnea living in a care facility and had developed 1-2 weeks prior change in behavior not tracking conversations and could not remember his medications and to use his CPAP at night as well had troubles with significant short-term memory.  He was using his walker more frequently as his balance appeared to be affected.  He then developed into having some speech difficulty with word finding and slurring his words.  Patient had usually been very healthy was not a smoker and had been doing transcendental meditation classes for many years.  They took him to the emergency department in the [facility].    Hospital course; in the ED a CT of the head found a new subacute left parietal lobe infarct with evidence of multiple chronic infarcts likely embolic in origin.  Their workup also revealed new diagnosed atrial fibrillation.  Neurology felt that he was beyond the timeline for TPA.  The CTA of the head and neck did not show any vascular abnormalities of hemorrhage.  His cholesterol was good with an LDL of 91 and his A1c was 6.5.  They did a TTE with bubble study and this did not show any intra-atrial shunt but did show bilateral biatrial enlargement with his ongoing A. fib.  He  became depressed during the hospitalization with new cognitive changes.    They elected to transfer him to us.  He is been on a vegetarian diet and they recommended continuing him on the aspirin 325 mg daily till April 29 and then transition to apixaban 5 mg twice daily on the 30th.  Although I do notice that the orders that we have have him on both.  They do recommend Lipitor 80 daily.  They have him following up with neurology in 4 weeks.  In regards to the new onset A. fib the felt aspirin for now and transition to apixaban.  Because our orders show that he is on both we will ask for clarification.    Past Medical History:   Diagnosis Date   ? A-fib (H)    ? Cognitive impairment     moderate   ? DMII (diabetes mellitus, type 2) (H)    ? Embolic stroke (H)    ? HTN (hypertension)    ? Hypothyroidism    ? SUJATHA (obstructive sleep apnea)    ? Parietal lobe infarction    ? Peripheral edema    ? Pulmonary hypertension (H)      Past Surgical History:   Procedure Laterality Date   ? SC ARTHROPLASTY TIBIAL PLATEAU      Description: Knee Replacement;  Proc Date: 04/01/2012;  Comments: [facility]   ? SC REMOVAL OF SPERM DUCT(S)      Description: Surgery Of Male Genitalia Vasectomy;  Recorded: 01/07/2010;   ? SC REMOVAL OF TONSILS,<11 Y/O      Description: Tonsillectomy;  Recorded: 01/07/2010;     No family history on file.  Social History     Socioeconomic History   ? Marital status:      Spouse name: Not on file   ? Number of children: Not on file   ? Years of education: Not on file   ? Highest education level: Not on file   Occupational History   ? Not on file   Social Needs   ? Financial resource strain: Not on file   ? Food insecurity:     Worry: Not on file     Inability: Not on file   ? Transportation needs:     Medical: Not on file     Non-medical: Not on file   Tobacco Use   ? Smoking status: Not on file   Substance and Sexual Activity   ? Alcohol use: Not on file   ? Drug use: Not on file   ?  Sexual activity: Not on file   Lifestyle   ? Physical activity:     Days per week: Not on file     Minutes per session: Not on file   ? Stress: Not on file   Relationships   ? Social connections:     Talks on phone: Not on file     Gets together: Not on file     Attends [employer] service: Not on file     Active member of club or organization: Not on file     Attends meetings of clubs or organizations: Not on file     Relationship status: Not on file   ? Intimate partner violence:     Fear of current or ex partner: Not on file     Emotionally abused: Not on file     Physically abused: Not on file     Forced sexual activity: Not on file   Other Topics Concern   ? Not on file   Social History Narrative   ? Not on file     Additional Geriatric Review uncertain historian but states he has 2 children and he and his wife of 6 others.  He was living in an assisted living.  Uncertain if this is true.  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  Current Outpatient Medications   Medication Sig Dispense Refill   ? acetaminophen (TYLENOL) 325 MG tablet Take 325-650 mg by mouth every 4 (four) hours as needed for pain.     ? amLODIPine (NORVASC) 5 MG tablet Take 5 mg by mouth daily.     ? apixaban (ELIQUIS) 5 mg Tab tablet Take 5 mg by mouth 2 (two) times a day.     ? aspirin 325 MG EC tablet Take 325 mg by mouth daily.     ? atorvastatin (LIPITOR) 80 MG tablet Take 80 mg by mouth at bedtime.     ? levothyroxine (SYNTHROID, LEVOTHROID) 100 MCG tablet Take 100 mcg by mouth daily.     ? lidocaine (LMX) 4 % cream Apply 1 application topically 3 (three) times a day as needed (apply for knee pain).     ? polyethylene glycol (MIRALAX) 17 gram packet Take 17 g by mouth daily as needed.     ? potassium chloride (KLOR-CON) 10 MEQ CR tablet Take 10 mEq by mouth daily.       No current facility-administered medications for this visit.      Allergies   Allergen Reactions   ? Naprosyn  [Naproxen]      Immunization History   Administered Date(s) Administered   ? Pneumo Polysac 23-V 04/17/2012   ? Td,adult,historic,unspecified 01/01/2007         Review of Systems   Unable to perform ROS: Acuity of condition        Physical Exam   Constitutional: He appears well-developed and well-nourished.   HENT:   Head: Normocephalic and atraumatic.   Right Ear: External ear normal.   Left Ear: External ear normal.   Nose: Nose normal.   Mouth/Throat: Oropharynx is clear and moist.   Eyes: Pupils are equal, round, and reactive to light. Conjunctivae and EOM are normal. Right eye exhibits no discharge. Left eye exhibits no discharge.   Neck: Neck supple. No JVD present. No tracheal deviation present. No thyromegaly present.   Cardiovascular: Normal rate, regular rhythm and intact distal pulses.   No murmur heard.  Irregular beat not rapid   Pulmonary/Chest: Effort normal and breath sounds normal. No respiratory distress. He has no wheezes. He has no rales. He exhibits no tenderness.   Abdominal: Soft. Bowel sounds are normal. He exhibits no distension and no mass. There is no tenderness. There is no guarding.   Musculoskeletal: Normal range of motion. He exhibits no edema or tenderness.   Lymphadenopathy:     He has no cervical adenopathy.   Neurological: He is alert. He has normal reflexes. No cranial nerve deficit. Coordination normal.   Tangential easily confused does not appear to be distressed.  Patient was able to walk 200feet and performed seated exercises pivot transfer from wheelchair to NuStep.  But appeared to be quite fatigued.  Needed verbal cues for correct technique as he was getting easily confused.  Patient had difficulty in OT following commands and doing tasks.  Speech therapy said that he scored 11/30 on his [place] cognitive assessment   Skin: Skin is warm and dry. No rash noted. No erythema.   Psychiatric: He has a normal mood and affect. His behavior is normal.         Labs:  All labs  reviewed in the nursing home record.    Assessment:  1. Cerebrovascular accident (CVA) due to embolism of cerebral artery (H)     2. Atrial fibrillation, new onset (H)     3. Cognitive impairment     4. Hypertension, unspecified type     5. Hypothyroidism, unspecified type         Plan: At this juncture we will do speech therapy to try to get his cognition back physical therapy for balance.  We will clarify right away the apixaban order.    Total 45 minutes of which 65% was spent in counseling and coordination of care of the above plan.    Electronically signed by: Quan Luis MD